# Patient Record
Sex: MALE | Race: BLACK OR AFRICAN AMERICAN | NOT HISPANIC OR LATINO | Employment: STUDENT | URBAN - METROPOLITAN AREA
[De-identification: names, ages, dates, MRNs, and addresses within clinical notes are randomized per-mention and may not be internally consistent; named-entity substitution may affect disease eponyms.]

---
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
## 2025-01-24 ENCOUNTER — OFFICE VISIT (OUTPATIENT)
Dept: URGENT CARE | Facility: CLINIC | Age: 20
End: 2025-01-24
Payer: MEDICAID

## 2025-01-24 VITALS
WEIGHT: 120 LBS | DIASTOLIC BLOOD PRESSURE: 73 MMHG | OXYGEN SATURATION: 100 % | HEIGHT: 67 IN | TEMPERATURE: 98 F | HEART RATE: 79 BPM | SYSTOLIC BLOOD PRESSURE: 116 MMHG | BODY MASS INDEX: 18.83 KG/M2 | RESPIRATION RATE: 16 BRPM

## 2025-01-24 DIAGNOSIS — B35.4 TINEA CORPORIS: Primary | ICD-10-CM

## 2025-01-24 DIAGNOSIS — L30.8 PRURITIC DERMATITIS: ICD-10-CM

## 2025-01-24 PROCEDURE — 99499 UNLISTED E&M SERVICE: CPT | Mod: S$GLB,,, | Performed by: NURSE PRACTITIONER

## 2025-01-24 RX ORDER — CLOTRIMAZOLE AND BETAMETHASONE DIPROPIONATE 10; .64 MG/G; MG/G
CREAM TOPICAL 2 TIMES DAILY
Qty: 45 G | Refills: 0 | Status: SHIPPED | OUTPATIENT
Start: 2025-01-24

## 2025-01-24 RX ORDER — HYDROXYZINE HYDROCHLORIDE 25 MG/1
25 TABLET, FILM COATED ORAL NIGHTLY PRN
Qty: 14 TABLET | Refills: 0 | Status: SHIPPED | OUTPATIENT
Start: 2025-01-24

## 2025-01-24 NOTE — PATIENT INSTRUCTIONS
Please follow up with your primary care provider within 2-5 days if your signs and symptoms have not resolved or worsen.     If your condition worsens or fails to improve we recommend that you receive another evaluation at the emergency room immediately or contact your primary medical clinic to discuss your concerns.   You must understand that you have received an Urgent Care treatment only and that you may be released before all of your medical problems are known or treated. You, the patient, will arrange for follow up care as instructed.     Take Hydroxyzine as needed before bed to relieve itching.  Keep skin adequately moisturized.  Mix Clotrimazole-betamethasone topical cream in Lubriderm lotion and apply to entire affected area twice daily for up to two weeks or until resolution.  You may purchase a bottle of Lubriderm lotion over the counter at any drug store.   If your rash worsens or fails to improve after two weeks of treatment, please return to [facility] for re-evaluation.

## 2025-01-24 NOTE — PROGRESS NOTES
"Subjective:      Patient ID: Fredy Chaidez is a 20 y.o. male.    Vitals:  height is 5' 7" (1.702 m) and weight is 54.4 kg (120 lb). His temperature is 97.8 °F (36.6 °C). His blood pressure is 116/73 and his pulse is 79. His respiration is 16 and oxygen saturation is 100%.     Chief Complaint: Rash    Pt is [employer] student in for multiple itchy rashes to his LUE and LLE since mid December. He denies use of any new creams, lotions, detergents, or medications. He denies history of skin conditions, environmental, food, or seasonal type allergies.   States rash is patchy/dry/scaly and has not improved/resolved since onset. He has not tried any OTC treatments or remedies. Rash is most pruritic at night and with heat exposure.      Rash  Pertinent negatives include no cough, fatigue, fever, sore throat or vomiting.     Constitution: Negative for chills, sweating, fatigue, fever and international travel in last 60 days.   HENT:  Negative for sore throat.    Neck: Negative for neck pain, neck stiffness and painful lymph nodes.   Respiratory:  Negative for cough.    Gastrointestinal:  Negative for nausea and vomiting.   Musculoskeletal:  Negative for abnormal ROM of joint, arthritis, back pain and muscle ache.   Skin:  Positive for rash. Negative for color change, pale and hives.   Allergic/Immunologic: Positive for itching. Negative for environmental allergies, seasonal allergies, food allergies, eczema, immunocompromised state and hives.   Neurological:  Negative for headaches, disorientation, altered mental status and tingling.   Hematologic/Lymphatic: Negative for swollen lymph nodes.   Psychiatric/Behavioral:  Negative for altered mental status, disorientation and confusion.       Objective:     Physical Exam   Constitutional: He is oriented to person, place, and time.   HENT:   Head: Normocephalic and atraumatic.   Nose: Nose normal.   Mouth/Throat: Mucous membranes are moist.   Cardiovascular: Normal rate and normal " pulses.   Pulmonary/Chest: Effort normal.   Abdominal: Normal appearance.   Musculoskeletal: Normal range of motion.         General: Normal range of motion.   Neurological: no focal deficit. He is alert and oriented to person, place, and time.   Skin: Skin is warm, dry, intact and rash.         Comments: Multiple inflammatory annular plaques with peripheral scaling are noted to LUE and LLE in addition to a few affected areas on the RLE.  No signs of infection.    Psychiatric: His behavior is normal. Mood normal.   Nursing note and vitals reviewed.      Assessment:     1. Tinea corporis    2. Pruritic dermatitis        Plan:       Tinea corporis  -     hydrOXYzine HCL (ATARAX) 25 MG tablet; Take 1 tablet (25 mg total) by mouth nightly as needed for Itching.  Dispense: 14 tablet; Refill: 0  -     clotrimazole-betamethasone 1-0.05% (LOTRISONE) cream; Apply topically 2 (two) times daily. Mix topical cream in Lubriderm lotion and apply to all affected areas twice daily for up to two weeks or until resolution.  Dispense: 45 g; Refill: 0    Pruritic dermatitis  -     hydrOXYzine HCL (ATARAX) 25 MG tablet; Take 1 tablet (25 mg total) by mouth nightly as needed for Itching.  Dispense: 14 tablet; Refill: 0  -     clotrimazole-betamethasone 1-0.05% (LOTRISONE) cream; Apply topically 2 (two) times daily. Mix topical cream in Lubriderm lotion and apply to all affected areas twice daily for up to two weeks or until resolution.  Dispense: 45 g; Refill: 0             Patient Instructions   Please follow up with your primary care provider within 2-5 days if your signs and symptoms have not resolved or worsen.     If your condition worsens or fails to improve we recommend that you receive another evaluation at the emergency room immediately or contact your primary medical clinic to discuss your concerns.   You must understand that you have received an Urgent Care treatment only and that you may be released before all of your medical  problems are known or treated. You, the patient, will arrange for follow up care as instructed.     Take Hydroxyzine as needed before bed to relieve itching.  Keep skin adequately moisturized.  Mix Clotrimazole-betamethasone topical cream in Lubriderm lotion and apply to entire affected area twice daily for up to two weeks or until resolution.  You may purchase a bottle of Lubriderm lotion over the counter at any drug store.   If your rash worsens or fails to improve after two weeks of treatment, please return to [facility] for re-evaluation.